# Patient Record
Sex: FEMALE | Race: WHITE
[De-identification: names, ages, dates, MRNs, and addresses within clinical notes are randomized per-mention and may not be internally consistent; named-entity substitution may affect disease eponyms.]

---

## 2020-08-13 ENCOUNTER — HOSPITAL ENCOUNTER (OUTPATIENT)
Dept: HOSPITAL 47 - RADUSWWP | Age: 35
Discharge: HOME | End: 2020-08-13
Attending: FAMILY MEDICINE
Payer: COMMERCIAL

## 2020-08-13 DIAGNOSIS — Z36.9: Primary | ICD-10-CM

## 2020-08-13 DIAGNOSIS — N91.2: ICD-10-CM

## 2020-08-13 DIAGNOSIS — R10.32: ICD-10-CM

## 2020-08-13 DIAGNOSIS — Z3A.01: ICD-10-CM

## 2020-08-13 PROCEDURE — 76801 OB US < 14 WKS SINGLE FETUS: CPT

## 2020-08-13 NOTE — US
EXAMINATION TYPE: Transabdominal

 

DATE OF EXAM: 8/13/2020 2:32 PM

 

COMPARISON: NONE

 

CLINICAL HISTORY: N91.2, R10.32, Z36 Confirm dates.

 

EXAM PERFORMED:  Transabdominal (TA)

 

EXAM MEASUREMENTS:

 

GESTATIONAL AGE / DATING

 

Physician Established: Not yet established

Dates by LMP: (7 weeks/5 days)  

** EDC: 3/27/20

Dates by First Scan:  No previous this is first 

Dates by Current Scan for: (6 weeks/1 days)  

** EDC: 4/7/20

 

MATERNAL ANATOMY 

 

Uterus: 10.7 X 5.8 X 7.2cm

Right Ovary: 3.3 x 2.3 x 2.3cm, cyst right ovary 2.4 x 2.1 x 2.2cm

Left Ovary: 2.6 x 1.5 x 1.6cm

Post CDS / Adnexa: wnl

Presence of free fluid: no

 

 

GESTATION / FETAL SURVEY

 

CRL: 0.5cm ( 6   weeks/1 days)

 

Yolk Sac (normal less than 6mm): 3mm

Heart Rate: 132 bpm

Rhythm:  Normal

IUP:  Viable IUP

 

 

Date of LMP: 6/20/20

 

 

IMPRESSION:

Viable single intrauterine pregnancy 6 weeks 1 day with heart rate of 132 bpm.

## 2021-03-31 ENCOUNTER — HOSPITAL ENCOUNTER (INPATIENT)
Dept: HOSPITAL 47 - 4FBP | Age: 36
LOS: 1 days | Discharge: HOME | End: 2021-04-01
Attending: OBSTETRICS & GYNECOLOGY | Admitting: OBSTETRICS & GYNECOLOGY
Payer: COMMERCIAL

## 2021-03-31 DIAGNOSIS — Z79.4: ICD-10-CM

## 2021-03-31 DIAGNOSIS — Z79.82: ICD-10-CM

## 2021-03-31 DIAGNOSIS — Z79.899: ICD-10-CM

## 2021-03-31 DIAGNOSIS — Z86.2: ICD-10-CM

## 2021-03-31 DIAGNOSIS — Z82.49: ICD-10-CM

## 2021-03-31 DIAGNOSIS — Z87.891: ICD-10-CM

## 2021-03-31 DIAGNOSIS — Z88.2: ICD-10-CM

## 2021-03-31 DIAGNOSIS — Z83.3: ICD-10-CM

## 2021-03-31 DIAGNOSIS — Z87.442: ICD-10-CM

## 2021-03-31 DIAGNOSIS — Z3A.39: ICD-10-CM

## 2021-03-31 DIAGNOSIS — E11.9: ICD-10-CM

## 2021-03-31 DIAGNOSIS — Z80.3: ICD-10-CM

## 2021-03-31 LAB
BASOPHILS # BLD AUTO: 0 K/UL (ref 0–0.2)
BASOPHILS NFR BLD AUTO: 0 %
EOSINOPHIL # BLD AUTO: 0.1 K/UL (ref 0–0.7)
EOSINOPHIL NFR BLD AUTO: 1 %
ERYTHROCYTE [DISTWIDTH] IN BLOOD BY AUTOMATED COUNT: 5.26 M/UL (ref 3.8–5.4)
ERYTHROCYTE [DISTWIDTH] IN BLOOD: 15.7 % (ref 11.5–15.5)
GLUCOSE BLD-MCNC: 79 MG/DL (ref 75–99)
GLUCOSE BLD-MCNC: 88 MG/DL (ref 75–99)
HBA1C MFR BLD: 5.8 % (ref 4–6)
HCT VFR BLD AUTO: 39.7 % (ref 34–46)
HGB BLD-MCNC: 13.3 GM/DL (ref 11.4–16)
LYMPHOCYTES # SPEC AUTO: 2.5 K/UL (ref 1–4.8)
LYMPHOCYTES NFR SPEC AUTO: 29 %
MCH RBC QN AUTO: 25.2 PG (ref 25–35)
MCHC RBC AUTO-ENTMCNC: 33.4 G/DL (ref 31–37)
MCV RBC AUTO: 75.5 FL (ref 80–100)
MONOCYTES # BLD AUTO: 0.4 K/UL (ref 0–1)
MONOCYTES NFR BLD AUTO: 5 %
NEUTROPHILS # BLD AUTO: 5.4 K/UL (ref 1.3–7.7)
NEUTROPHILS NFR BLD AUTO: 63 %
PLATELET # BLD AUTO: 274 K/UL (ref 150–450)
WBC # BLD AUTO: 8.5 K/UL (ref 3.8–10.6)

## 2021-03-31 PROCEDURE — 00HU33Z INSERTION OF INFUSION DEVICE INTO SPINAL CANAL, PERCUTANEOUS APPROACH: ICD-10-PCS

## 2021-03-31 PROCEDURE — 10907ZC DRAINAGE OF AMNIOTIC FLUID, THERAPEUTIC FROM PRODUCTS OF CONCEPTION, VIA NATURAL OR ARTIFICIAL OPENING: ICD-10-PCS

## 2021-03-31 PROCEDURE — 86900 BLOOD TYPING SEROLOGIC ABO: CPT

## 2021-03-31 PROCEDURE — 3E033VJ INTRODUCTION OF OTHER HORMONE INTO PERIPHERAL VEIN, PERCUTANEOUS APPROACH: ICD-10-PCS

## 2021-03-31 PROCEDURE — 85025 COMPLETE CBC W/AUTO DIFF WBC: CPT

## 2021-03-31 PROCEDURE — 86901 BLOOD TYPING SEROLOGIC RH(D): CPT

## 2021-03-31 PROCEDURE — 3E0R3BZ INTRODUCTION OF ANESTHETIC AGENT INTO SPINAL CANAL, PERCUTANEOUS APPROACH: ICD-10-PCS

## 2021-03-31 PROCEDURE — 83036 HEMOGLOBIN GLYCOSYLATED A1C: CPT

## 2021-03-31 PROCEDURE — 86850 RBC ANTIBODY SCREEN: CPT

## 2021-03-31 RX ADMIN — OXYTOCIN-SODIUM CHLORIDE 0.9% IV SOLN 30 UNIT/500ML SCH MLS/HR: 30-0.9/5 SOLUTION at 19:29

## 2021-03-31 RX ADMIN — IBUPROFEN SCH MG: 600 TABLET ORAL at 18:48

## 2021-03-31 RX ADMIN — POTASSIUM CHLORIDE SCH MLS/HR: 14.9 INJECTION, SOLUTION INTRAVENOUS at 15:52

## 2021-03-31 RX ADMIN — DOCUSATE SODIUM AND SENNOSIDES SCH EACH: 50; 8.6 TABLET ORAL at 21:34

## 2021-03-31 RX ADMIN — POTASSIUM CHLORIDE SCH MLS/HR: 14.9 INJECTION, SOLUTION INTRAVENOUS at 11:07

## 2021-03-31 RX ADMIN — ACETAMINOPHEN PRN MG: 325 TABLET, FILM COATED ORAL at 21:26

## 2021-03-31 RX ADMIN — OXYTOCIN-SODIUM CHLORIDE 0.9% IV SOLN 30 UNIT/500ML SCH MLS/HR: 30-0.9/5 SOLUTION at 06:46

## 2021-03-31 RX ADMIN — POTASSIUM CHLORIDE SCH MLS/HR: 14.9 INJECTION, SOLUTION INTRAVENOUS at 06:46

## 2021-03-31 NOTE — P.HPOB
History of Present Illness


H&P Date: 21


Chief Complaint: Here for induction of labor





This is a 35-year-old white female  5 para 20-2 EDC 2021 at 39 

weeks gestation.  Patient presents today for induction by recommendation of 

maternal fetal medicine team.  She is a history of type 2 diabetes mellitus on 

insulin, and gestational hypertension, as well as advanced maternal age.  This 

morning she is feeling well, she denies fluid leakage or vaginal bleeding.  She 

is having irregular spontaneous contractions.





Past medical history is significant for insulin-dependent diabetes, anemia, and 

history of kidney stones.





Past surgical history wisdom teeth extracted in the past.





Current medications insulin per pump, low dose baby aspirin daily, metformin 500

mg twice daily.





ALLERGIES none known.





Family history significant for diabetes, breast cancer, hypertension.





Reproductive history normal spontaneous vaginal deliveries 2, most recent 

delivery 11 years ago.





Social history patient is , she is a nurse at [employer].

 She is a former tobacco smoker quit in .





Prenatal history significant for blood type O+, rubella status immune.  Urine 

culture, hepatitis B surface antigen, gonorrhea and chlamydia cultures, group B 

strep cultures all negative.  Rubella status immune.  Pap smear negative.





On exam patient is 5 foot 7 inches, 267 pounds, blood pressure on admission 

130/105, repeat 140s over 70s.  Initial heart rate 120.  Gen. physical exam is 

within normal limits.  Extremities reveal no edema.  Fundus is firm, midline, 39

week size.  Cervix is 3 cm dilated, soft, 60% effaced, -2 station, vertex 

presentation, anterior.  Artificial amniorrhexis reveals clear fluid.  Fetal 

heart rate is consistent with reactive NST.





Impression: 39 week intrauterine pregnancy, advanced maternal age, type 2 

diabetes on insulin, gestational hypertension.  Blood sugar on admission 79.





Plan continue close maternal and fetal surveillance.  Analgesic options have 

been reviewed.  Patient will manage her blood sugar as per protocol in agreement

with maternal-fetal medicine.  Anticipate normal spontaneous vaginal delivery.





Review of Systems


Constitutional: Reports as per HPI





Past Medical History


Past Medical History: Diabetes Mellitus


History of Any Multi-Drug Resistant Organisms: None Reported


Past Surgical History: No Surgical Hx Reported


Past Anesthesia/Blood Transfusion Reactions: No Reported Reaction


Past Psychological History: No Psychological Hx Reported


Smoking Status: Never smoker


Past Alcohol Use History: None Reported


Past Drug Use History: None Reported





- Past Family History


  ** Mother


Family Medical History: Diabetes Mellitus





Medications and Allergies


                                Home Medications











 Medication  Instructions  Recorded  Confirmed  Type


 


Aspirin [Adult Low Dose Aspirin EC] 81 mg PO 21  History


 


Docusate [Colace] 100 mg PO ONCE 21 History


 


Insulin Aspart [NovoLOG] 8 units SQ AC-BRKFST 21 History


 


Insulin Aspart [NovoLOG] 12 units SQ AC-LUNCH 21 History


 


Insulin Aspart [NovoLOG] 22 units SQ AC-SUPPER 21 History


 


Insulin NPH Hum/Reg Insulin Hm 32 unit SQ HS 21 History





[NovoLIN 70-30 100 UNIT/ML VIAL]    


 


Insulin NPH Hum/Reg Insulin Hm 42 unit SQ AC-BRKFST 21 History





[NovoLIN 70-30 100 UNIT/ML VIAL]    


 


Pnv No.95/Ferrous Fum/Folic AC 1 tab PO DAILY 21 History





[Prenatal Multivitamin Tablet]    








                                    Allergies











Allergy/AdvReac Type Severity Reaction Status Date / Time


 


sulfamethoxazole AdvReac  Diarrhea Verified 21 06:23





[From Bactrim]     


 


trimethoprim [From Bactrim] AdvReac  Diarrhea Verified 21 06:23














Exam


                                   Vital Signs











  Temp Pulse Resp BP Pulse Ox


 


 21 06:37  96.1 F L  120 H  16  130/105  98








                                Intake and Output











 21





 22:59 06:59 14:59


 


Other:   


 


  Weight  121.109 kg 














See dictation in HPI please





Results


Result Diagrams: 


                                 21 06:20





                  Abnormal Lab Results - Last 24 Hours (Table)











  21 Range/Units





  06:20 


 


MCV  75.5 L  (80.0-100.0)  fL


 


RDW  15.7 H  (11.5-15.5)  %














Assessment and Plan


Assessment: 





39 week intrauterine pregnancy, advanced maternal age, insulin-dependent 

diabetes, gestational hypertension.  All signs this morning reassuring.  Here 

for induction of labor.


Plan: 





Oxytocin per hospital protocol.  Close maternal and fetal surveillance.  Insulin

and blood sugar management as per maternal fetal medicine recommendations.  

Analgesic options reviewed.  Anticipate normal spontaneous vaginal delivery.


Time with Patient: Less than 30

## 2021-03-31 NOTE — P.PROBDLV
Vaginal Delivery Note





- .


Vaginal Delivery Note: 





This is a 35-year-old female  5 para 20-2 EDC 2021 at 39 weeks 

gestation who presented for induction.  Pregnancy is remarkable for type 2 

diabetes mellitus on insulin, gestational hypertension, advanced maternal age.  

[place] strep cultures negative.  Please see history and physical for details.





Artificial amniorrhexis revealed clear fluid.  Oxytocin was started and titrated

per hospital protocol.  Patient became uncomfortable and requested epidural, thi

s was placed without difficulty.  She progressed well through the first stage of

labor was judged to be completely dilated at 1712 hours.  She began the second 

stage of labor at that time.





With excellent maternal expulsive efforts and position changes, infant's head 

ultimately descended.  Perineal body was prepped and draped in usual sterile 

fashion.  Infant's head delivered occiput anterior and she restituted 

accordingly.  There was no nuchal cord noted.  The right or anterior shoulder 

was delivered from underneath the pubic symphysis at which time the oropharynx, 

and nasopharynx, and external nares were all bulb suctioned.  Patient was 

officially delivered a liveborn female infant at 1742 hours.  Umbilical cord was

doubly clamped and ligated, she was handed to waiting nurses for evaluation 

where Apgar scores of 8 and 9 at one and 5 minutes respectively were given.  

Placenta delivered spontaneously, it was inspected and noted to be intact at 

1745 hours.





At this time the uterus was massaged.  Careful inspection of the cervix, vagina,

perineum, periurethral, and perirectal areas revealed no lacerations and no 

defects.  Total estimated blood loss 200 mL's.  All sponge needle and en

hancement counts are correct.  Infant weighed 4160 g or 9 lbs. 2 oz.  The 

patient and her family are allowed to begin the bonding experience in the LDR.  

Blood sugar prior to delivery 87, repeat blood sugar pending.

## 2021-04-01 VITALS — RESPIRATION RATE: 16 BRPM | TEMPERATURE: 97.8 F

## 2021-04-01 VITALS — DIASTOLIC BLOOD PRESSURE: 67 MMHG | HEART RATE: 94 BPM | SYSTOLIC BLOOD PRESSURE: 118 MMHG

## 2021-04-01 LAB
BASOPHILS # BLD AUTO: 0 K/UL (ref 0–0.2)
BASOPHILS NFR BLD AUTO: 0 %
EOSINOPHIL # BLD AUTO: 0 K/UL (ref 0–0.7)
EOSINOPHIL NFR BLD AUTO: 0 %
ERYTHROCYTE [DISTWIDTH] IN BLOOD BY AUTOMATED COUNT: 4.29 M/UL (ref 3.8–5.4)
ERYTHROCYTE [DISTWIDTH] IN BLOOD: 15.6 % (ref 11.5–15.5)
HCT VFR BLD AUTO: 32.3 % (ref 34–46)
HGB BLD-MCNC: 11.4 GM/DL (ref 11.4–16)
LYMPHOCYTES # SPEC AUTO: 2.3 K/UL (ref 1–4.8)
LYMPHOCYTES NFR SPEC AUTO: 20 %
MCH RBC QN AUTO: 26.5 PG (ref 25–35)
MCHC RBC AUTO-ENTMCNC: 35.2 G/DL (ref 31–37)
MCV RBC AUTO: 75.2 FL (ref 80–100)
MONOCYTES # BLD AUTO: 0.7 K/UL (ref 0–1)
MONOCYTES NFR BLD AUTO: 6 %
NEUTROPHILS # BLD AUTO: 8.3 K/UL (ref 1.3–7.7)
NEUTROPHILS NFR BLD AUTO: 73 %
PLATELET # BLD AUTO: 219 K/UL (ref 150–450)
WBC # BLD AUTO: 11.5 K/UL (ref 3.8–10.6)

## 2021-04-01 RX ADMIN — ACETAMINOPHEN PRN MG: 325 TABLET, FILM COATED ORAL at 16:51

## 2021-04-01 RX ADMIN — IBUPROFEN SCH: 600 TABLET ORAL at 19:34

## 2021-04-01 RX ADMIN — DOCUSATE SODIUM AND SENNOSIDES SCH EACH: 50; 8.6 TABLET ORAL at 08:17

## 2021-04-01 RX ADMIN — IBUPROFEN SCH MG: 600 TABLET ORAL at 12:05

## 2021-04-01 RX ADMIN — IBUPROFEN SCH MG: 600 TABLET ORAL at 00:34

## 2021-04-01 RX ADMIN — IBUPROFEN SCH: 600 TABLET ORAL at 08:16

## 2021-04-01 RX ADMIN — ACETAMINOPHEN PRN MG: 325 TABLET, FILM COATED ORAL at 03:40

## 2021-04-02 NOTE — P.DS
Providers


Date of admission: 


21 06:05





Expected date of discharge: 21


Attending physician: 


Arlene Fernandez





Primary care physician: 


Stated None





Hospital Course: 





This is a 35-year-old white female  5 para 20-2 at 39 weeks gestation.  

Patient's pregnancy is remarkable for advanced maternal age, chronic 

hypertension, insulin-dependent diabetes.  She was co-managed with maternal-

fetal medicine team out at [facility].  Recommendation was for 

delivery at 39 weeks, cervix is favorable, patient therefore presents for 

induction of labor.  Please see dictated history and physical for details.





Artificial amniorrhexis revealed clear fluid.  Epidural was placed per her 

request.  Oxytocin was started and titrated.  Patient went on to deliver 

vaginally a liveborn female infant with Apgar scores of 8 and 9 at one and 5 

minutes respectively.  Infant weighed 4160 g or 9 lbs. 2 oz.  Patient had a 

small first-degree perineal laceration that was easily repaired.  Delivery was 

otherwise unremarkable.  Blood pressure normal throughout her stay, blood sugar 

also within the normal range. 





Patient was discharged home in very good condition.  She will continue checking 

her blood sugars as per recommendations from maternal-fetal medicine.  She will 

continue taking her prenatal vitamin daily.  Breast-feeding is going well, she 

has a breast pump at home.  She will call me with any fevers shakes or chills, 

foul smelling or copious lochia, with the passage of large blood clots, with any

pain not alleviated by over-the-counter products, or indeed with any concerns.  

Contraceptive choices have been reviewed and we will discuss this further in the

office.   infant will follow-up with pediatrician as per recommendations.


Assessment: 


Doing well status post vaginal delivery


Patient Condition at Discharge: Good





Plan - Discharge Summary


Discharge Rx Participant: No


New Discharge Prescriptions: 


No Action


   Insulin NPH Hum/Reg Insulin Hm [NovoLIN 70-30 100 UNIT/ML VIAL] 32 unit SQ HS


   Insulin Aspart [NovoLOG] 8 units SQ AC-BRKFST


   Docusate [Colace] 100 mg PO ONCE


   Pnv No.95/Ferrous Fum/Folic AC [Prenatal Multivitamin Tablet] 1 tab PO DAILY


   Insulin NPH Hum/Reg Insulin Hm [NovoLIN 70-30 100 UNIT/ML VIAL] 42 unit SQ 

AC-BRKFST


   Insulin Aspart [NovoLOG] 12 units SQ AC-LUNCH


   Insulin Aspart [NovoLOG] 22 units SQ AC-SUPPER


   Aspirin [Adult Low Dose Aspirin EC] 81 mg PO


Discharge Medication List





Aspirin [Adult Low Dose Aspirin EC] 81 mg PO 21 [History]


Docusate [Colace] 100 mg PO ONCE 21 [History]


Insulin Aspart [NovoLOG] 8 units SQ AC-BRKFST 21 [History]


Insulin Aspart [NovoLOG] 12 units SQ AC-LUNCH 21 [History]


Insulin Aspart [NovoLOG] 22 units SQ AC-SUPPER 21 [History]


Insulin NPH Hum/Reg Insulin Hm [NovoLIN 70-30 100 UNIT/ML VIAL] 32 unit SQ HS 

21 [History]


Insulin NPH Hum/Reg Insulin Hm [NovoLIN 70-30 100 UNIT/ML VIAL] 42 unit SQ AC-

BRKFST 21 [History]


Pnv No.95/Ferrous Fum/Folic AC [Prenatal Multivitamin Tablet] 1 tab PO DAILY 

21 [History]








Follow up Appointment(s)/Referral(s): 


Arlene Fernandez MD [STAFF PHYSICIAN] - 6 Weeks


Patient Instructions/Handouts:  *Surgery [facility] - Laser Vaporization of 

Cervical/Vaginal Lesions Discharge Instr, Vaginal Delivery (DC)


Discharge Disposition: HOME SELF-CARE